# Patient Record
Sex: MALE | Race: BLACK OR AFRICAN AMERICAN | NOT HISPANIC OR LATINO | Employment: OTHER | URBAN - METROPOLITAN AREA
[De-identification: names, ages, dates, MRNs, and addresses within clinical notes are randomized per-mention and may not be internally consistent; named-entity substitution may affect disease eponyms.]

---

## 2020-01-31 ENCOUNTER — TRANSCRIBE ORDERS (OUTPATIENT)
Dept: ADMINISTRATIVE | Facility: HOSPITAL | Age: 65
End: 2020-01-31

## 2020-01-31 DIAGNOSIS — E83.19 IRON OVERLOAD: Primary | ICD-10-CM

## 2020-02-04 PROBLEM — E83.19 IRON OVERLOAD: Status: ACTIVE | Noted: 2020-02-04

## 2020-02-04 RX ORDER — LIDOCAINE HYDROCHLORIDE 10 MG/ML
5 INJECTION, SOLUTION INFILTRATION; PERINEURAL ONCE
Status: CANCELLED
Start: 2020-02-07

## 2020-02-07 ENCOUNTER — HOSPITAL ENCOUNTER (OUTPATIENT)
Dept: INFUSION THERAPY | Facility: HOSPITAL | Age: 65
Discharge: HOME | End: 2020-02-07
Admitting: SPECIALIST

## 2020-02-07 VITALS
HEIGHT: 74 IN | HEART RATE: 87 BPM | TEMPERATURE: 98.1 F | RESPIRATION RATE: 20 BRPM | WEIGHT: 191 LBS | SYSTOLIC BLOOD PRESSURE: 118 MMHG | DIASTOLIC BLOOD PRESSURE: 69 MMHG | OXYGEN SATURATION: 94 % | BODY MASS INDEX: 24.51 KG/M2

## 2020-02-07 DIAGNOSIS — E83.19 IRON OVERLOAD: ICD-10-CM

## 2020-02-07 LAB
HCT VFR BLD AUTO: 46.5 % (ref 37.5–51)
HGB BLD-MCNC: 16.1 G/DL (ref 13–17.7)

## 2020-02-07 PROCEDURE — 85014 HEMATOCRIT: CPT | Performed by: SPECIALIST

## 2020-02-07 PROCEDURE — 85018 HEMOGLOBIN: CPT | Performed by: SPECIALIST

## 2020-02-07 PROCEDURE — 25010000003 LIDOCAINE 1 % SOLUTION: Performed by: SPECIALIST

## 2020-02-07 PROCEDURE — 99195 PHLEBOTOMY: CPT

## 2020-02-07 PROCEDURE — 36415 COLL VENOUS BLD VENIPUNCTURE: CPT

## 2020-02-07 RX ORDER — LIDOCAINE HYDROCHLORIDE 10 MG/ML
1 INJECTION, SOLUTION INFILTRATION; PERINEURAL ONCE
Status: COMPLETED | OUTPATIENT
Start: 2020-02-07 | End: 2020-02-07

## 2020-02-07 RX ORDER — AMLODIPINE BESYLATE AND BENAZEPRIL HYDROCHLORIDE 10; 20 MG/1; MG/1
1 CAPSULE ORAL DAILY
COMMUNITY

## 2020-02-07 RX ORDER — TRIAMCINOLONE ACETONIDE 1 MG/ML
LOTION TOPICAL 2 TIMES DAILY
COMMUNITY

## 2020-02-07 RX ADMIN — LIDOCAINE HYDROCHLORIDE 1 ML: 10 INJECTION, SOLUTION INFILTRATION; PERINEURAL at 10:38

## 2020-02-07 NOTE — PROGRESS NOTES
Prior Phlebotomy: Yes ?  No ?x      If so any side effects?  no    Lot # of Phlebotomy bag: [id number]    Start Time: 1040    Stop Time:1051    Amount removed: 517.7  Grams / 1.06 =  488.4        cc’s     Pt labs indicate phlebotomy is required per MD orders. Performed phlebotomy without complication. Pt tolerated well.2x2's and pressure dressing (coflex) placed at puncture site. Denies C/O post procedure. Took po Peps[employer] and lay in bed for approximately 10 minutes post procedure. Pt given written and verbal instructions for post phlebotomy care. Pt verbalized understanding. AVS printed and pt D/C ambulatory at 1110.

## 2020-02-07 NOTE — PATIENT INSTRUCTIONS
Therapeutic Phlebotomy  Therapeutic phlebotomy is the planned removal of blood from a person's body for the purpose of treating a medical condition. The procedure is similar to donating blood. Usually, about a pint (470 mL, or 0.47 L) of blood is removed. The average adult has 9-12 pints (4.3-5.7 L) of blood in the body.  Therapeutic phlebotomy may be used to treat the following medical conditions:  · Hemochromatosis. This is a condition in which the blood contains too much iron.  · Polycythemia vera. This is a condition in which the blood contains too many red blood cells.  · Porphyria cutanea tarda. This is a disease in which an important part of hemoglobin is not made properly. It results in the buildup of abnormal amounts of porphyrins in the body.  · Sickle cell disease. This is a condition in which the red blood cells form an abnormal crescent shape rather than a round shape.  Tell a health care provider about:  · Any allergies you have.  · All medicines you are taking, including vitamins, herbs, eye drops, creams, and over-the-counter medicines.  · Any problems you or family members have had with anesthetic medicines.  · Any blood disorders you have.  · Any surgeries you have had.  · Any medical conditions you have.  · Whether you are pregnant or may be pregnant.  What are the risks?  Generally, this is a safe procedure. However, problems may occur, including:  · Nausea or light-headedness.  · Low blood pressure (hypotension).  · Soreness, bleeding, swelling, or bruising at the needle insertion site.  · Infection.  What happens before the procedure?  · Follow instructions from your health care provider about eating or drinking restrictions.  · Ask your health care provider about:  ? Changing or stopping your regular medicines. This is especially important if you are taking diabetes medicines or blood thinners (anticoagulants).  ? Taking medicines such as aspirin and ibuprofen. These medicines can thin your  blood. Do not take these medicines unless your health care provider tells you to take them.  ? Taking over-the-counter medicines, vitamins, herbs, and supplements.  · Wear clothing with sleeves that can be raised above the elbow.  · Plan to have someone take you home from the hospital or clinic.  · You may have a blood sample taken.  · Your blood pressure, pulse rate, and breathing rate will be measured.  What happens during the procedure?    · To lower your risk of infection:  ? Your health care team will wash or sanitize their hands.  ? Your skin will be cleaned with an antiseptic.  · You may be given a medicine to numb the area (local anesthetic).  · A tourniquet will be placed on your arm.  · A needle will be inserted into one of your veins.  · Tubing and a collection bag will be attached to that needle.  · Blood will flow through the needle and tubing into the collection bag.  · The collection bag will be placed lower than your arm to allow gravity to help the flow of blood into the bag.  · You may be asked to open and close your hand slowly and continually during the entire collection.  · After the specified amount of blood has been removed from your body, the collection bag and tubing will be clamped.  · The needle will be removed from your vein.  · Pressure will be held on the site of the needle insertion to stop the bleeding.  · A bandage (dressing) will be placed over the needle insertion site.  The procedure may vary among health care providers and hospitals.  What happens after the procedure?  · Your blood pressure, pulse rate, and breathing rate will be measured after the procedure.  · You will be encouraged to drink fluids.  · Your recovery will be assessed and monitored.  · You can return to your normal activities as told by your health care provider.  Summary  · Therapeutic phlebotomy is the planned removal of blood from a person's body for the purpose of treating a medical condition.  · Therapeutic  phlebotomy may be used to treat hemochromatosis, polycythemia vera, porphyria cutanea tarda, or sickle cell disease.  · In the procedure, a needle is inserted and about a pint (470 mL, or 0.47 L) of blood is removed. The average adult has 9-12 pints (4.3-5.7 L) of blood in the body.  · This is generally a safe procedure, but it can sometimes cause problems such as nausea, light-headedness, or low blood pressure (hypotension).  This information is not intended to replace advice given to you by your health care provider. Make sure you discuss any questions you have with your health care provider.  Document Released: 05/21/2012 Document Revised: 01/03/2019 Document Reviewed: 01/03/2019  [employer] Patient Education © 2019 [employer]    Therapeutic Phlebotomy, Care After  This sheet gives you information about how to care for yourself after your procedure. Your health care provider may also give you more specific instructions. If you have problems or questions, contact your health care provider.  What can I expect after the procedure?  After the procedure, it is common to have:  · Light-headedness or dizziness. You may feel faint.  · Nausea.  · Tiredness (fatigue).  Follow these instructions at home:  Eating and drinking  · Be sure to eat well-balanced meals for the next 24 hours.  · Drink enough fluid to keep your urine pale yellow.  · Avoid drinking alcohol on the day that you had the procedure.  Activity    · Return to your normal activities as told by your health care provider. Most people can go back to their normal activities right away.  · Avoid activities that take a lot of effort for about 5 hours after the procedure. Athletes should avoid strenuous exercise for at least 12 hours.  · Avoid heavy lifting or pulling for about 5 hours after the procedure. Do not lift anything that is heavier than 10 lb (4.5 kg).  · Change positions slowly for the remainder of the day. This will help to prevent  light-headedness or fainting.  · If you feel light-headed, lie down until the feeling goes away.  Needle insertion site care    · Keep your bandage (dressing) dry. You can remove the bandage after about 5 hours or as told by your health care provider.  · If you have bleeding from the needle insertion site, raise (elevate) your arm and press firmly on the site until the bleeding stops.  · If you have bruising at the site, apply ice to the area:  ? Remove the dressing.  ? Put ice in a plastic bag.  ? Place a towel between your skin and the bag.  ? Leave the ice on for 20 minutes, 2-3 times a day for the first 24 hours.  · If the swelling does not go away after 24 hours, apply a warm, moist cloth (warm compress) to the area for 20 minutes, 2-3 times a day.  General instructions  · Do not use any products that contain nicotine or tobacco, such as cigarettes and e-cigarettes, for at least 30 minutes after the procedure.  · Keep all follow-up visits as told by your health care provider. This is important. You may need to continue having regular therapeutic phlebotomy treatments as directed.  Contact a health care provider if you:  · Have redness, swelling, or pain at the needle insertion site.  · Have fluid or blood coming from the needle insertion site.  · Have pus or a bad smell coming from the needle insertion site.  · Notice that the needle insertion site feels warm to the touch.  · Feel light-headed, dizzy, or nauseous, and the feeling does not go away.  · Have new bruising at the needle insertion site.  · Feel weaker than normal.  · Have a fever or chills.  Get help right away if:  · You faint.  · You have chest pain.  · You have trouble breathing.  · You have severe nausea or vomiting.  Summary  · After the procedure, it is common to have some light-headedness, dizziness, nausea, or tiredness (fatigue).  · Be sure to eat well-balanced meals for the next 24 hours. Drink enough fluid to keep your urine pale  yellow.  · Return to your normal activities as told by your health care provider.  · Keep all follow-up visits as told by your health care provider. You may need to continue having regular therapeutic phlebotomy treatments as directed.  This information is not intended to replace advice given to you by your health care provider. Make sure you discuss any questions you have with your health care provider.  Document Released: 05/21/2012 Document Revised: 01/03/2019 Document Reviewed: 01/03/2019  [employer] Interactive Patient Education © 2019 [employer]

## 2021-10-18 ENCOUNTER — TRANSCRIBE ORDERS (OUTPATIENT)
Dept: ADMINISTRATIVE | Facility: HOSPITAL | Age: 66
End: 2021-10-18

## 2021-10-18 DIAGNOSIS — E83.19 IRON OVERLOAD: ICD-10-CM

## 2021-10-18 DIAGNOSIS — N00.8 ACUTE (DIFFUSE) PROLIFERATIVE GLOMERULONEPHRITIS: Primary | ICD-10-CM

## 2021-10-22 ENCOUNTER — HOSPITAL ENCOUNTER (OUTPATIENT)
Dept: INFUSION THERAPY | Facility: HOSPITAL | Age: 66
Setting detail: INFUSION SERIES
Discharge: HOME | End: 2021-10-22

## 2021-10-22 VITALS
HEIGHT: 74 IN | DIASTOLIC BLOOD PRESSURE: 82 MMHG | OXYGEN SATURATION: 97 % | RESPIRATION RATE: 20 BRPM | HEART RATE: 79 BPM | WEIGHT: 195 LBS | BODY MASS INDEX: 25.03 KG/M2 | SYSTOLIC BLOOD PRESSURE: 142 MMHG | TEMPERATURE: 97.7 F

## 2021-10-22 DIAGNOSIS — E83.19 IRON OVERLOAD: ICD-10-CM

## 2021-10-22 LAB
HCT VFR BLD AUTO: 49 % (ref 37.5–51)
HGB BLD-MCNC: 16.6 G/DL (ref 13–17.7)

## 2021-10-22 PROCEDURE — 85018 HEMOGLOBIN: CPT | Performed by: SPECIALIST

## 2021-10-22 PROCEDURE — 85014 HEMATOCRIT: CPT | Performed by: SPECIALIST

## 2021-10-22 PROCEDURE — 99195 PHLEBOTOMY: CPT

## 2021-10-22 PROCEDURE — 36415 COLL VENOUS BLD VENIPUNCTURE: CPT

## 2021-10-22 RX ORDER — FOLIC ACID 1 MG/1
1 TABLET ORAL DAILY
COMMUNITY

## 2021-10-22 RX ORDER — B-COMPLEX WITH VITAMIN C
250 TABLET ORAL DAILY
COMMUNITY

## 2021-10-22 NOTE — PROGRESS NOTES
Prior Phlebotomy: Yes ?x  No ?      If so any side effects?  none    Lot # of Phlebotomy bag: [id number]    Start Time: 1230    Stop Time: 1245    Amount removed: 541GMS    Grams / 1.06 = 510.4cc’s   Patient stated he had not had breakfast or lunch prior to admission, only coffee.  Outpatient turkey lunch box and apple juice given while waiting for HGB results.  HGB result noted and phlebotomy indicated per physician parameter.  Patient tolerated procedure well, VSS after and PO water taken.  Co-flex pressure dressing to right arm site.  AVS given and patient discharged home ambulatory after appointment completed.

## 2021-10-22 NOTE — PATIENT INSTRUCTIONS
Therapeutic Phlebotomy, Care After  This sheet gives you information about how to care for yourself after your procedure. Your health care provider may also give you more specific instructions. If you have problems or questions, contact your health care provider.  What can I expect after the procedure?  After the procedure, it is common to have:  · Light-headedness or dizziness. You may feel faint.  · Nausea.  · Tiredness (fatigue).  Follow these instructions at home:  Eating and drinking  · Be sure to eat well-balanced meals for the next 24 hours.  · Drink enough fluid to keep your urine pale yellow.  · Avoid drinking alcohol on the day that you had the procedure.  Activity    · Return to your normal activities as told by your health care provider. Most people can go back to their normal activities right away.  · Avoid activities that take a lot of effort for about 5 hours after the procedure. Athletes should avoid strenuous exercise for at least 12 hours.  · Avoid heavy lifting or pulling for about 5 hours after the procedure. Do not lift anything that is heavier than 10 lb (4.5 kg).  · Change positions slowly for the remainder of the day. This will help to prevent light-headedness or fainting.  · If you feel light-headed, lie down until the feeling goes away.    Needle insertion site care    · Keep your bandage (dressing) dry. You can remove the bandage after about 5 hours or as told by your health care provider.  · If you have bleeding from the needle insertion site, raise (elevate) your arm and press firmly on the site until the bleeding stops.  · If you have bruising at the site, apply ice to the area:  ? Remove the dressing.  ? Put ice in a plastic bag.  ? Place a towel between your skin and the bag.  ? Leave the ice on for 20 minutes, 2-3 times a day for the first 24 hours.  · If the swelling does not go away after 24 hours, apply a warm, moist cloth (warm compress) to the area for 20 minutes, 2-3 times a  day.    General instructions  · Do not use any products that contain nicotine or tobacco, such as cigarettes and e-cigarettes, for at least 30 minutes after the procedure.  · Keep all follow-up visits as told by your health care provider. This is important. You may need to continue having regular therapeutic phlebotomy treatments as directed.  Contact a health care provider if you:  · Have redness, swelling, or pain at the needle insertion site.  · Have fluid or blood coming from the needle insertion site.  · Have pus or a bad smell coming from the needle insertion site.  · Notice that the needle insertion site feels warm to the touch.  · Feel light-headed, dizzy, or nauseous, and the feeling does not go away.  · Have new bruising at the needle insertion site.  · Feel weaker than normal.  · Have a fever or chills.  Get help right away if:  · You faint.  · You have chest pain.  · You have trouble breathing.  · You have severe nausea or vomiting.  Summary  · After the procedure, it is common to have some light-headedness, dizziness, nausea, or tiredness (fatigue).  · Be sure to eat well-balanced meals for the next 24 hours. Drink enough fluid to keep your urine pale yellow.  · Return to your normal activities as told by your health care provider.  · Keep all follow-up visits as told by your health care provider. You may need to continue having regular therapeutic phlebotomy treatments as directed.  This information is not intended to replace advice given to you by your health care provider. Make sure you discuss any questions you have with your health care provider.  Document Revised: 01/04/2019 Document Reviewed: 01/03/2019  [employer] Patient Education © 2021 [employer]  Hemochromatosis  Hemochromatosis is a condition in which the body stores too much iron. This is also called iron storage disease or iron overload disorder. The extra iron builds up in your joints, heart, liver, pancreas, and other organs, where it  can cause damage.  There are two forms of this condition; they include:  · Hereditary hemochromatosis. Defects (mutations) on certain genes can cause symptoms to develop. With this type of the condition, the body absorbs more iron than it needs from the foods you eat.  · Secondary hemochromatosis. With this type of the condition, iron builds up in the body due to other reasons, such as from liver disease or blood transfusions.  What are the causes?  This condition may be caused by:  · Abnormal genes passed down from both parents (inherited).  · Receiving blood from a donor (blood transfusion).  · Problems with the way the body uses iron in the bone marrow (ineffective erythropoiesis).  · Having chronic liver disease, such as hepatitis or liver cancer.  What increases the risk?  You are more likely to develop this condition if you:  · Inherit certain abnormal gene mutations from both parents.  · Are white ().  · Have severe or long-term (chronic) anemia.  What are the signs or symptoms?  Signs and symptoms can start at any age, but they usually start in middle age. They may include:  · Fatigue.  · Weakness.  · Joint pain and stiffness.  · Abdominal pain.  · Weight loss.  · Skin turning a gray or bronze color.  · Loss of interest in sex.  · Loss of menstrual periods, in women.  · Loss of body hair.  · Shortness of breath.  As hemochromatosis gets worse, it may damage the liver, heart, or pancreas. This may lead to complications such as:  · Diabetes.  · Liver cancer.  · Abnormal heart rhythms.  · Heart failure.  How is this diagnosed?  This condition may be diagnosed based on:  · Your symptoms and medical history.  · A physical exam.  · Blood tests.  · Genetic testing.  · Removal and testing of a sample of liver tissue (liver biopsy).  How is this treated?  This condition is most often treated with:  · Therapeutic phlebotomy. In this procedure, some of your blood is removed periodically in order to reduce the  amount of iron in your body. Over time, your body will naturally replace the blood cells that you lose during phlebotomy. At the start of treatment, you may have a unit of blood removed once or twice a week. You will have blood tests during this time to determine when your iron levels return to normal. Once your iron levels are normal, you may only need to have a phlebotomy every few months.  · Lifestyle changes. This may include not drinking alcohol and limiting certain items in your diet.  · Medicines to remove excess iron (chelation therapy).  · Medicines to help treat any related conditions.  · Genetic counseling. If you are found to have a gene mutation, other family members may need to be tested for hereditary hemochromatosis.  Follow these instructions at home:  Alcohol use    · If you have liver damage, do not drink alcohol.  · If you do not have liver damage:  ? Do not drink alcohol if:  § Your health care provider tells you not to drink.  § You are pregnant, may be pregnant, or are planning to become pregnant.  ? If you drink alcohol, limit how much you have:  § 0-1 drink a day for women.  § 0-2 drinks a day for men.  ? Be aware of how much alcohol is in your drink. In the [place] one drink equals one typical bottle of beer (12 oz), one-half glass of wine (5 oz), or one shot of hard liquor (1½ oz).    General instructions  · Stay active. Exercise for at least 30 minutes on most days of the week.  · Take over-the-counter and prescription medicines only as told by your health care provider. This includes vitamins and supplements.  · You may need to have frequent blood or urine testing to monitor your condition and to look for complications.  · Follow any instructions as directed by your health care provider regarding dietary restrictions.  · Do not:  ? Take vitamins or supplements that contain iron.  ? Take vitamin C supplements. Vitamin C makes your body absorb more iron from foods.  ? Eat raw shellfish or  raw fish. Hemochromatosis may increase your chance for infection from these foods.  · Keep all follow-up visits as told by your health care provider. This is important.  Contact a health care provider if you have:  · Fatigue.  · Unusual weakness.  · Shortness of breath.  · Joint pain.  · Abdominal pain.  · Weight loss.  Get help right away if you have:  · Chest pain.  · Trouble breathing.  Summary  · Hemochromatosis is a condition in which your body stores too much iron. This is also called iron storage disease or iron overload disorder.  · The extra iron builds up in your joints, heart, liver, pancreas, and other organs, where it can cause damage.  · Signs and symptoms can start at any age, but they usually start in middle age.  · To treat this condition, you will need to have some of your blood removed periodically (therapeutic phlebotomy). Removing some of your blood also removes iron from your body.  · You may need to have frequent blood or urine testing to monitor your condition and to look for complications.  This information is not intended to replace advice given to you by your health care provider. Make sure you discuss any questions you have with your health care provider.  Document Revised: 05/20/2021 Document Reviewed: 05/20/2021  [employer] Patient Education © 2021 [employer]

## 2022-11-11 ENCOUNTER — TRANSCRIBE ORDERS (OUTPATIENT)
Dept: HOME HEALTH SERVICES | Facility: HOME HEALTHCARE | Age: 67
End: 2022-11-11

## 2022-11-11 ENCOUNTER — HOME HEALTH ADMISSION (OUTPATIENT)
Dept: HOME HEALTH SERVICES | Facility: HOME HEALTHCARE | Age: 67
End: 2022-11-11

## 2022-11-11 DIAGNOSIS — M25.512 LEFT SHOULDER PAIN, UNSPECIFIED CHRONICITY: Primary | ICD-10-CM
